# Patient Record
Sex: FEMALE | Race: WHITE | URBAN - METROPOLITAN AREA
[De-identification: names, ages, dates, MRNs, and addresses within clinical notes are randomized per-mention and may not be internally consistent; named-entity substitution may affect disease eponyms.]

---

## 2023-11-13 ENCOUNTER — OFFICE (OUTPATIENT)
Dept: URBAN - METROPOLITAN AREA CLINIC 109 | Facility: CLINIC | Age: 68
Setting detail: OPHTHALMOLOGY
End: 2023-11-13
Payer: COMMERCIAL

## 2023-11-13 DIAGNOSIS — H43.392: ICD-10-CM

## 2023-11-13 DIAGNOSIS — H02.401: ICD-10-CM

## 2023-11-13 PROCEDURE — 92014 COMPRE OPH EXAM EST PT 1/>: CPT | Performed by: OPHTHALMOLOGY

## 2023-11-13 ASSESSMENT — REFRACTION_AUTOREFRACTION
OD_SPHERE: -1.00
OS_AXIS: 95
OS_CYLINDER: -0.50
OD_AXIS: 66
OS_SPHERE: -0.25
OD_CYLINDER: -1.25

## 2023-11-13 ASSESSMENT — SPHEQUIV_DERIVED
OS_SPHEQUIV: -0.5
OD_SPHEQUIV: -1.5
OD_SPHEQUIV: -1.625

## 2023-11-13 ASSESSMENT — REFRACTION_CURRENTRX
OS_SPHERE: +2.75
OD_OVR_VA: 20/
OS_OVR_VA: 20/
OD_SPHERE: +2.75

## 2023-11-13 ASSESSMENT — CONFRONTATIONAL VISUAL FIELD TEST (CVF)
OD_FINDINGS: FULL
OS_FINDINGS: FULL

## 2023-11-13 ASSESSMENT — REFRACTION_MANIFEST
OD_VA1: 20/20
OD_AXIS: 65
OS_VA1: 20/20
OS_SPHERE: PL
OD_CYLINDER: -0.50
OS_CYLINDER: -0.75
OD_SPHERE: -1.25
OS_AXIS: 110

## 2023-11-13 ASSESSMENT — LID POSITION - PTOSIS: OD_PTOSIS: RUL 1+

## 2024-07-19 PROBLEM — Z00.00 ENCOUNTER FOR PREVENTIVE HEALTH EXAMINATION: Status: ACTIVE | Noted: 2024-07-19

## 2024-07-22 ENCOUNTER — APPOINTMENT (OUTPATIENT)
Dept: OBGYN | Facility: CLINIC | Age: 69
End: 2024-07-22
Payer: COMMERCIAL

## 2024-07-22 VITALS
HEIGHT: 60 IN | SYSTOLIC BLOOD PRESSURE: 118 MMHG | DIASTOLIC BLOOD PRESSURE: 74 MMHG | BODY MASS INDEX: 34.75 KG/M2 | WEIGHT: 177 LBS

## 2024-07-22 DIAGNOSIS — Z85.42 PERSONAL HISTORY OF MALIGNANT NEOPLASM OF OTHER PARTS OF UTERUS: ICD-10-CM

## 2024-07-22 DIAGNOSIS — Z12.39 ENCOUNTER FOR OTHER SCREENING FOR MALIGNANT NEOPLASM OF BREAST: ICD-10-CM

## 2024-07-22 DIAGNOSIS — Z01.419 ENCOUNTER FOR GYNECOLOGICAL EXAMINATION (GENERAL) (ROUTINE) W/OUT ABNORMAL FINDINGS: ICD-10-CM

## 2024-07-22 DIAGNOSIS — N60.11 DIFFUSE CYSTIC MASTOPATHY OF RIGHT BREAST: ICD-10-CM

## 2024-07-22 DIAGNOSIS — M85.80 OTHER SPECIFIED DISORDERS OF BONE DENSITY AND STRUCTURE, UNSPECIFIED SITE: ICD-10-CM

## 2024-07-22 DIAGNOSIS — N60.12 DIFFUSE CYSTIC MASTOPATHY OF RIGHT BREAST: ICD-10-CM

## 2024-07-22 DIAGNOSIS — N95.2 POSTMENOPAUSAL ATROPHIC VAGINITIS: ICD-10-CM

## 2024-07-22 DIAGNOSIS — Z86.79 PERSONAL HISTORY OF OTHER DISEASES OF THE CIRCULATORY SYSTEM: ICD-10-CM

## 2024-07-22 DIAGNOSIS — Z80.0 FAMILY HISTORY OF MALIGNANT NEOPLASM OF DIGESTIVE ORGANS: ICD-10-CM

## 2024-07-22 DIAGNOSIS — J45.909 UNSPECIFIED ASTHMA, UNCOMPLICATED: ICD-10-CM

## 2024-07-22 DIAGNOSIS — R32 UNSPECIFIED URINARY INCONTINENCE: ICD-10-CM

## 2024-07-22 RX ORDER — LEVOTHYROXINE SODIUM 125 UG/1
125 TABLET ORAL
Refills: 0 | Status: ACTIVE | COMMUNITY

## 2024-07-22 RX ORDER — ROSUVASTATIN CALCIUM 20 MG/1
20 TABLET, FILM COATED ORAL
Refills: 0 | Status: ACTIVE | COMMUNITY

## 2024-07-22 RX ORDER — LOSARTAN POTASSIUM 50 MG/1
50 TABLET, FILM COATED ORAL
Refills: 0 | Status: ACTIVE | COMMUNITY

## 2024-07-22 NOTE — PHYSICAL EXAM
[Appropriately responsive] : appropriately responsive [Alert] : alert [No Acute Distress] : no acute distress [No Lymphadenopathy] : no lymphadenopathy [Regular Rate Rhythm] : regular rate rhythm [No Murmurs] : no murmurs [Clear to Auscultation B/L] : clear to auscultation bilaterally [Soft] : soft [Non-tender] : non-tender [Non-distended] : non-distended [No HSM] : No HSM [No Lesions] : no lesions [No Mass] : no mass [Oriented x3] : oriented x3 [FreeTextEntry1] : Normal, no lesions, atrophy noted throughout [FreeTextEntry2] : Normal, no lesions [FreeTextEntry4] : Normal, no lesions seen or palpated.  Vaginal cuff intact with no visible or palpable masses. [FreeTextEntry6] : Surgically absent.  No adnexal masses or tenderness [FreeTextEntry5] : Surgically absent [FreeTextEntry9] : Deferred, getting colonoscopy again soon

## 2024-07-22 NOTE — HISTORY OF PRESENT ILLNESS
[postmenopausal] : postmenopausal [Y] : Positive pregnancy history [Currently Active] : currently active [Men] : men [Vaginal] : vaginal [TextBox_4] : Personal history of uterine cancer in 2004, status post E lap, DEBRA, BSO [Mammogramdate] : 2023 [TextBox_19] : NRAD with sono, benign [TextBox_25] : DENSE TISSUE [TextBox_31] : Before 2020, negative [BoneDensityDate] : 2024 [TextBox_37] : DEVANG OTEOPENIA; was told by endocrinologist that due to her parathyroidectomy and thyroidectomy she would not be a candidate ever for bisphosphonates [ColonoscopyDate] : 2023 [TextBox_43] : Incomplete emptying, so returning 2024 for repeat [PGxTotal] : 1 [[facility] : [facility] [FreeTextEntry1] :

## 2024-07-22 NOTE — PLAN
[FreeTextEntry1] : Prescription for mammo and sono given. Pap smear drawn and sent due to personal history of uterine cancer to be sure no pathology found.  Exam does not reveal any pathologic findings. Is up-to-date with her bone density from this year.  Shows osteopenia.  Was told by her endocrinologist she would never be a candidate for any bisphosphonate therapy due to her history of parathyroid and thyroidectomy. Referral to urogyn given due to her recurrent urinary incontinence. Extensively reviewed treatment options for her atrophic vaginitis.  She will try Key-E and Revaree suppositories and alternating fashions.  I also suggested she buy lubricant called Uberlube to use as needed relations

## 2024-07-23 DIAGNOSIS — N64.4 MASTODYNIA: ICD-10-CM

## 2024-07-25 LAB — CYTOLOGY CVX/VAG DOC THIN PREP: ABNORMAL

## 2024-11-14 ENCOUNTER — OFFICE (OUTPATIENT)
Dept: URBAN - METROPOLITAN AREA CLINIC 109 | Facility: CLINIC | Age: 69
Setting detail: OPHTHALMOLOGY
End: 2024-11-14
Payer: COMMERCIAL

## 2024-11-14 DIAGNOSIS — H43.392: ICD-10-CM

## 2024-11-14 DIAGNOSIS — H02.401: ICD-10-CM

## 2024-11-14 PROCEDURE — 92014 COMPRE OPH EXAM EST PT 1/>: CPT | Performed by: OPHTHALMOLOGY

## 2024-11-14 ASSESSMENT — REFRACTION_CURRENTRX
OD_OVR_VA: 20/
OS_SPHERE: +2.75
OD_SPHERE: +2.75
OS_OVR_VA: 20/

## 2024-11-14 ASSESSMENT — REFRACTION_MANIFEST
OS_SPHERE: -0.25
OD_SPHERE: -0.75
OD_VA1: 20/20-
OD_CYLINDER: -0.75
OD_AXIS: 65
OS_VA1: 20/20
OS_CYLINDER: -0.75
OS_AXIS: 110

## 2024-11-14 ASSESSMENT — KERATOMETRY
OS_K2POWER_DIOPTERS: 42.25
OS_K1POWER_DIOPTERS: 42.00
OD_K2POWER_DIOPTERS: 43.25
OS_AXISANGLE_DEGREES: 025
OD_AXISANGLE_DEGREES: 085
OD_K1POWER_DIOPTERS: 42.75

## 2024-11-14 ASSESSMENT — TONOMETRY
OD_IOP_MMHG: 17
OS_IOP_MMHG: 15

## 2024-11-14 ASSESSMENT — REFRACTION_AUTOREFRACTION
OD_CYLINDER: -0.75
OS_CYLINDER: -1.00
OS_AXIS: 110
OS_SPHERE: -0.25
OD_AXIS: 067
OD_SPHERE: -2.00

## 2024-11-14 ASSESSMENT — LID POSITION - PTOSIS: OD_PTOSIS: RUL 1+

## 2024-11-14 ASSESSMENT — VISUAL ACUITY
OD_BCVA: 20/20-1
OS_BCVA: 20/30-2